# Patient Record
Sex: FEMALE | Race: WHITE | HISPANIC OR LATINO | URBAN - METROPOLITAN AREA
[De-identification: names, ages, dates, MRNs, and addresses within clinical notes are randomized per-mention and may not be internally consistent; named-entity substitution may affect disease eponyms.]

---

## 2022-09-01 ENCOUNTER — LAB OUTSIDE AN ENCOUNTER (OUTPATIENT)
Dept: URBAN - METROPOLITAN AREA CLINIC 23 | Facility: CLINIC | Age: 47
End: 2022-09-01

## 2022-09-01 ENCOUNTER — DASHBOARD ENCOUNTERS (OUTPATIENT)
Age: 47
End: 2022-09-01

## 2022-09-01 ENCOUNTER — OFFICE VISIT (OUTPATIENT)
Dept: URBAN - METROPOLITAN AREA CLINIC 23 | Facility: CLINIC | Age: 47
End: 2022-09-01
Payer: COMMERCIAL

## 2022-09-01 VITALS
DIASTOLIC BLOOD PRESSURE: 70 MMHG | HEART RATE: 74 BPM | HEIGHT: 59 IN | TEMPERATURE: 97.7 F | SYSTOLIC BLOOD PRESSURE: 104 MMHG | WEIGHT: 113 LBS | BODY MASS INDEX: 22.78 KG/M2

## 2022-09-01 DIAGNOSIS — R10.84 ABDOMINAL PAIN, GENERALIZED: ICD-10-CM

## 2022-09-01 DIAGNOSIS — R63.4 WEIGHT LOSS: ICD-10-CM

## 2022-09-01 DIAGNOSIS — R68.81 EARLY SATIETY: ICD-10-CM

## 2022-09-01 DIAGNOSIS — R11.0 NAUSEA: ICD-10-CM

## 2022-09-01 PROCEDURE — 99244 OFF/OP CNSLTJ NEW/EST MOD 40: CPT | Performed by: INTERNAL MEDICINE

## 2022-09-01 PROCEDURE — 99204 OFFICE O/P NEW MOD 45 MIN: CPT | Performed by: INTERNAL MEDICINE

## 2022-09-01 NOTE — HPI-TODAY'S VISIT:
- 47 yo  female from [facility], referred by Dr. Ronna Chacon for further evaluation of GI complaints as detailed below, which have been ongoing for approximately 1 year.  A copy of this note will be sent to the referring physician. - LUQ abdominal pain which occurs about once per week.  The pain varies between burning and stabbing, 6/10 in intensity.  May radiate to her epigastrium and also to her LLQ.  The pain tends to arise about 30 minutes after a meal and it lasts about 1 hour. - Associated symptoms include intermittent early satiety, nausea, dyspepsia, heartburn, and abdominal bloating.  She takes Tums as needed, usually about once every 3 months.  She has chronic constipation, which she manages with diet.  Rare diarrhea episodes. - She has lost about 12 lbs over the past year.  States some of this weight loss may be intentional, as she has been eating healthier over the past year. - Patient has never had a colonoscopy in the past - States her mother apparently had colon cancer at age 45, and that she also had breast cancer and uterine cancer.  States her mother passed away at age 59. - States that multiple maternal second degree relatives also  from cancer, including breast cancer, uterine cancer, and leukemia - States a daughter was diagnosed with osteosarcoma at age 12  - States she had recent bloodwork done at PCP's office

## 2022-12-01 ENCOUNTER — TELEPHONE ENCOUNTER (OUTPATIENT)
Dept: URBAN - METROPOLITAN AREA SURGERY CENTER 15 | Facility: SURGERY CENTER | Age: 47
End: 2022-12-01

## 2022-12-12 ENCOUNTER — OFFICE VISIT (OUTPATIENT)
Dept: URBAN - METROPOLITAN AREA MEDICAL CENTER 27 | Facility: MEDICAL CENTER | Age: 47
End: 2022-12-12
Payer: COMMERCIAL

## 2022-12-12 DIAGNOSIS — K31.89 ACQUIRED DEFORMITY OF DUODENUM: ICD-10-CM

## 2022-12-12 DIAGNOSIS — K63.89 BACTERIAL OVERGROWTH SYNDROME: ICD-10-CM

## 2022-12-12 DIAGNOSIS — Z80.0 BROTHER AT YOUNG AGE FAMILY HISTORY OF COLON CANCER: ICD-10-CM

## 2022-12-12 DIAGNOSIS — R10.13 ABDOMINAL DISCOMFORT, EPIGASTRIC: ICD-10-CM

## 2022-12-12 DIAGNOSIS — K31.7 BENIGN GASTRIC POLYP: ICD-10-CM

## 2022-12-12 DIAGNOSIS — D12.2 ADENOMA OF ASCENDING COLON: ICD-10-CM

## 2022-12-12 DIAGNOSIS — R11.0 AM NAUSEA: ICD-10-CM

## 2022-12-12 DIAGNOSIS — Z12.11 COLON CANCER SCREENING: ICD-10-CM

## 2022-12-12 PROCEDURE — 45380 COLONOSCOPY AND BIOPSY: CPT | Performed by: INTERNAL MEDICINE

## 2022-12-12 PROCEDURE — 45385 COLONOSCOPY W/LESION REMOVAL: CPT | Performed by: INTERNAL MEDICINE

## 2022-12-12 PROCEDURE — 43239 EGD BIOPSY SINGLE/MULTIPLE: CPT | Performed by: INTERNAL MEDICINE

## 2025-05-07 ENCOUNTER — OFFICE VISIT (OUTPATIENT)
Dept: URBAN - METROPOLITAN AREA CLINIC 128 | Facility: CLINIC | Age: 50
End: 2025-05-07
Payer: COMMERCIAL

## 2025-05-07 DIAGNOSIS — Z86.0100 PERSONAL HISTORY OF COLONIC POLYPS: ICD-10-CM

## 2025-05-07 DIAGNOSIS — Z80.0 FAMILY HISTORY OF COLON CANCER: ICD-10-CM

## 2025-05-07 DIAGNOSIS — R10.32 LEFT LOWER QUADRANT ABDOMINAL PAIN: ICD-10-CM

## 2025-05-07 DIAGNOSIS — Z80.9 FAMILY HISTORY OF CANCER: ICD-10-CM

## 2025-05-07 PROCEDURE — 99214 OFFICE O/P EST MOD 30 MIN: CPT | Performed by: PHYSICIAN ASSISTANT

## 2025-05-07 RX ORDER — HYOSCYAMINE SULFATE 0.12 MG/1
1 TABLET UNDER THE TONGUE AND ALLOW TO DISSOLVE  AS NEEDED TABLET, ORALLY DISINTEGRATING ORAL THREE TIMES A DAY
Qty: 30 | Refills: 0 | OUTPATIENT
Start: 2025-05-07

## 2025-05-07 NOTE — PHYSICAL EXAM GASTROINTESTINAL
Abdomen , soft, tenderness to palpation in the LLQ, nondistended , no guarding or rigidity , no masses palpable , normal bowel sounds, negative Henning's sign, negative Rovsing's sign, negative psoas and obturators signs, negative CVA tenderness bilaterally Liver and Spleen , no hepatosplenomegaly Rectal deferred

## 2025-05-07 NOTE — HPI-TODAY'S VISIT:
- 47 yo  female from [facility], referred by Dr. Ronna Chacon for further evaluation of GI complaints as detailed below, which have been ongoing for approximately 1 year.  A copy of this note will be sent to the referring physician. She is having LLQ abdominal pain that radiates to her back  - States her mother apparently had colon cancer at age 45, and that she also had breast cancer and uterine cancer.  States her mother passed away at age 59. - States that multiple maternal second degree relatives also  from cancer, including breast cancer, uterine cancer, and leukemia Rufino is up to date on her mammogram and PAP smear, she will have then repeate din 2026 - States a daughter was diagnosed with osteosarcoma at age 12  - States she had recent bloodwork done at PCP's office   EGD and colonoscopy revealed: A. DUODENUM, BIOPSY: - SMALL BOWEL MUCOSA WITH NO SIGNIFICANT DIAGNOSTIC ABNORMALITIES. - NO CELIAC DISEASE, DYSPLASIA, OR MALIGNANCY. B. GASTRIC POLYP, BIOPSY: - POLYPOID FRAGMENTS OF GASTRIC MUCOSA WITH REACTIVE/REPARATIVE CHANGES. - NO HELICOBACTER PYLORI, DYSPLASIA OR MALIGNANCY. C. GASTRIC, BIOPSY: - FUNDIC-AND ANTRAL-TYPE GASTRIC MUCOSA WITH REACTIVE/REPARATIVE CHANGES. - NO HELICOBACTER PYLORI, DYSPLASIA, OR MALIGNANCY. D. ESOPHAGUS, BIOPSY: - SQUAMOUS MUCOSA WITH MINIMAL REACTIVE/REPARATIVE CHANGES. - NO EOSINOPHILIC ESOPHAGITIS, DYSPLASIA, OR MALIGNANCY. E. ASCENDING POLYPS X2, POLYPECTOMY: - TUBULAR ADENOMA. - MINUTE FRAGMENT OF UNREMARKABLE COLONIC MUCOSA. F. RECTAL POLYPS X2, POLYPECTOMY: - COLONIC MUCOSA WITH PROMINENT REACTIVE LYMPHOID AGGREGATES.

## 2025-05-09 LAB
A/G RATIO: 2
ABSOLUTE BASOPHILS: 49
ABSOLUTE EOSINOPHILS: 79
ABSOLUTE LYMPHOCYTES: 1610
ABSOLUTE MONOCYTES: 397
ABSOLUTE NEUTROPHILS: 3965
ALBUMIN: 4.7
ALKALINE PHOSPHATASE: 100
ALT (SGPT): 26
AST (SGOT): 21
BASOPHILS: 0.8
BILIRUBIN, TOTAL: 0.5
BUN/CREATININE RATIO: 25
BUN: 10
C-REACTIVE PROTEIN, QUANT: <3
CALCIUM: 9.5
CARBON DIOXIDE, TOTAL: 29
CHLORIDE: 104
CREATININE: 0.4
EGFR: 120
EOSINOPHILS: 1.3
GLOBULIN, TOTAL: 2.4
GLUCOSE: 113
HEMATOCRIT: 43.6
HEMOGLOBIN: 14.6
IMMUNOGLOBULIN A, QN, SERUM: 222
INTERPRETATION: (no result)
LYMPHOCYTES: 26.4
MCH: 32.3
MCHC: 33.5
MCV: 96.5
MONOCYTES: 6.5
MPV: 10.5
NEUTROPHILS: 65
PLATELET COUNT: 261
POTASSIUM: 4.1
PROTEIN, TOTAL: 7.1
RDW: 11.8
RED BLOOD CELL COUNT: 4.52
SODIUM: 141
T-TRANSGLUTAMINASE (TTG) IGA: <1
WHITE BLOOD CELL COUNT: 6.1

## 2025-06-04 ENCOUNTER — OFFICE VISIT (OUTPATIENT)
Dept: URBAN - METROPOLITAN AREA CLINIC 128 | Facility: CLINIC | Age: 50
End: 2025-06-04
Payer: COMMERCIAL

## 2025-06-04 DIAGNOSIS — Z80.0 FAMILY HISTORY OF COLON CANCER IN MOTHER: ICD-10-CM

## 2025-06-04 DIAGNOSIS — Z86.0100 PERSONAL HISTORY OF COLONIC POLYPS: ICD-10-CM

## 2025-06-04 DIAGNOSIS — K59.00 CONSTIPATION, UNSPECIFIED CONSTIPATION TYPE: ICD-10-CM

## 2025-06-04 DIAGNOSIS — R10.32 LEFT LOWER QUADRANT ABDOMINAL PAIN: ICD-10-CM

## 2025-06-04 PROBLEM — 14760008: Status: ACTIVE | Noted: 2025-06-04

## 2025-06-04 PROCEDURE — 99214 OFFICE O/P EST MOD 30 MIN: CPT | Performed by: PHYSICIAN ASSISTANT

## 2025-06-04 RX ORDER — LACTULOSE 10 G/15ML
15 ML AS NEEDED SOLUTION ORAL ONCE A DAY
Qty: 450 ML | Refills: 1 | OUTPATIENT
Start: 2025-06-04

## 2025-06-04 RX ORDER — HYOSCYAMINE SULFATE 0.12 MG/1
1 TABLET UNDER THE TONGUE AND ALLOW TO DISSOLVE  AS NEEDED TABLET, ORALLY DISINTEGRATING ORAL THREE TIMES A DAY
Qty: 30 | Refills: 0 | COMMUNITY
Start: 2025-05-07

## 2025-06-04 NOTE — HPI-TODAY'S VISIT:
- 45 yo  female from [facility], referred by Dr. Ronna Chacon for further evaluation of GI complaints as detailed below, which have been ongoing for approximately 1 year.  A copy of this note will be sent to the referring physician. She is having LLQ abdominal pain that radiates to her back which has improved now She is still having constipation of 1 BM every 2 days - States her mother apparently had colon cancer at age 45, and that she also had breast cancer and uterine cancer.  States her mother passed away at age 59. - States that multiple maternal second degree relatives also  from cancer, including breast cancer, uterine cancer, and leukemia Patigiorgio is up to date on her mammogram and PAP smear, she will have then repeate din 2026 - States a daughter was diagnosed with osteosarcoma at age 12  - States she had recent bloodwork done at PCP's office   EGD and colonoscopy revealed: A. DUODENUM, BIOPSY: - SMALL BOWEL MUCOSA WITH NO SIGNIFICANT DIAGNOSTIC ABNORMALITIES. - NO CELIAC DISEASE, DYSPLASIA, OR MALIGNANCY. B. GASTRIC POLYP, BIOPSY: - POLYPOID FRAGMENTS OF GASTRIC MUCOSA WITH REACTIVE/REPARATIVE CHANGES. - NO HELICOBACTER PYLORI, DYSPLASIA OR MALIGNANCY. C. GASTRIC, BIOPSY: - FUNDIC-AND ANTRAL-TYPE GASTRIC MUCOSA WITH REACTIVE/REPARATIVE CHANGES. - NO HELICOBACTER PYLORI, DYSPLASIA, OR MALIGNANCY. D. ESOPHAGUS, BIOPSY: - SQUAMOUS MUCOSA WITH MINIMAL REACTIVE/REPARATIVE CHANGES. - NO EOSINOPHILIC ESOPHAGITIS, DYSPLASIA, OR MALIGNANCY. E. ASCENDING POLYPS X2, POLYPECTOMY: - TUBULAR ADENOMA. - MINUTE FRAGMENT OF UNREMARKABLE COLONIC MUCOSA. F. RECTAL POLYPS X2, POLYPECTOMY: - COLONIC MUCOSA WITH PROMINENT REACTIVE LYMPHOID AGGREGATES, colonosocpy to be reppeate din 5 years in 2027 CT A/P revelaed  1.    Large amount of stool noted throughout the colon, which may be due to constipation.        2.    No other significant abnormality is seen in the abdomen or pelvis.

## 2025-06-05 LAB
FREE THYROXINE INDEX: 2
T3 UPTAKE: 23
THYROXINE (T4): 8.7
TSH: 4.34

## 2025-08-06 ENCOUNTER — OFFICE VISIT (OUTPATIENT)
Dept: URBAN - METROPOLITAN AREA CLINIC 128 | Facility: CLINIC | Age: 50
End: 2025-08-06
Payer: COMMERCIAL

## 2025-08-06 ENCOUNTER — TELEPHONE ENCOUNTER (OUTPATIENT)
Dept: URBAN - METROPOLITAN AREA CLINIC 128 | Facility: CLINIC | Age: 50
End: 2025-08-06

## 2025-08-06 DIAGNOSIS — K59.00 CONSTIPATION, UNSPECIFIED: ICD-10-CM

## 2025-08-06 DIAGNOSIS — R10.32 LEFT LOWER QUADRANT ABDOMINAL PAIN: ICD-10-CM

## 2025-08-06 DIAGNOSIS — Z86.0100 PERSONAL HISTORY OF COLONIC POLYPS: ICD-10-CM

## 2025-08-06 DIAGNOSIS — Z80.0 FAMILY HISTORY OF COLON CANCER IN MOTHER: ICD-10-CM

## 2025-08-06 PROCEDURE — 99213 OFFICE O/P EST LOW 20 MIN: CPT | Performed by: PHYSICIAN ASSISTANT

## 2025-08-06 RX ORDER — HYOSCYAMINE SULFATE 0.12 MG/1
1 TABLET UNDER THE TONGUE AND ALLOW TO DISSOLVE  AS NEEDED TABLET, ORALLY DISINTEGRATING ORAL THREE TIMES A DAY
Qty: 30 | Refills: 0 | Status: DISCONTINUED | COMMUNITY
Start: 2025-05-07

## 2025-08-06 RX ORDER — LACTULOSE 10 G/15ML
15 ML AS NEEDED SOLUTION ORAL ONCE A DAY
Qty: 450 ML | Refills: 1 | Status: DISCONTINUED | COMMUNITY
Start: 2025-06-04

## 2025-08-29 ENCOUNTER — OFFICE VISIT (OUTPATIENT)
Dept: URBAN - METROPOLITAN AREA MEDICAL CENTER 28 | Facility: MEDICAL CENTER | Age: 50
End: 2025-08-29